# Patient Record
Sex: FEMALE | Race: WHITE | NOT HISPANIC OR LATINO | URBAN - METROPOLITAN AREA
[De-identification: names, ages, dates, MRNs, and addresses within clinical notes are randomized per-mention and may not be internally consistent; named-entity substitution may affect disease eponyms.]

---

## 2024-05-24 ENCOUNTER — HOSPITAL ENCOUNTER (OUTPATIENT)
Facility: HOSPITAL | Age: 44
Setting detail: OBSERVATION
Discharge: HOME | End: 2024-05-25
Attending: EMERGENCY MEDICINE | Admitting: HOSPITALIST
Payer: OTHER GOVERNMENT

## 2024-05-24 ENCOUNTER — APPOINTMENT (OUTPATIENT)
Dept: CT IMAGING | Facility: HOSPITAL | Age: 44
End: 2024-05-24
Payer: OTHER GOVERNMENT

## 2024-05-24 VITALS
HEIGHT: 63 IN | SYSTOLIC BLOOD PRESSURE: 143 MMHG | OXYGEN SATURATION: 98 % | DIASTOLIC BLOOD PRESSURE: 95 MMHG | HEART RATE: 73 BPM | RESPIRATION RATE: 18 BRPM | WEIGHT: 146.1 LBS | TEMPERATURE: 98.6 F | BODY MASS INDEX: 25.89 KG/M2

## 2024-05-24 DIAGNOSIS — T50.905A DRUG-INDUCED HEPATITIS: Primary | ICD-10-CM

## 2024-05-24 DIAGNOSIS — K71.6 DRUG-INDUCED HEPATITIS: Primary | ICD-10-CM

## 2024-05-24 DIAGNOSIS — R17 JAUNDICE: ICD-10-CM

## 2024-05-24 PROBLEM — S36.119A LIVER INJURY: Status: ACTIVE | Noted: 2024-05-24

## 2024-05-24 LAB
ALBUMIN SERPL-MCNC: 4.2 G/DL (ref 3.5–5.2)
ALBUMIN/GLOB SERPL: 1.4 G/DL
ALP SERPL-CCNC: 376 U/L (ref 39–117)
ALT SERPL W P-5'-P-CCNC: 330 U/L (ref 1–33)
ANION GAP SERPL CALCULATED.3IONS-SCNC: 9 MMOL/L (ref 5–15)
APAP SERPL-MCNC: 7.7 MCG/ML (ref 0–30)
AST SERPL-CCNC: 235 U/L (ref 1–32)
B-HCG UR QL: NEGATIVE
BASOPHILS # BLD AUTO: 0.02 10*3/MM3 (ref 0–0.2)
BASOPHILS NFR BLD AUTO: 0.3 % (ref 0–1.5)
BILIRUB SERPL-MCNC: 3.1 MG/DL (ref 0–1.2)
BILIRUB UR QL STRIP: ABNORMAL
BUN SERPL-MCNC: 11 MG/DL (ref 6–20)
BUN/CREAT SERPL: 10.9 (ref 7–25)
CALCIUM SPEC-SCNC: 9.5 MG/DL (ref 8.6–10.5)
CHLORIDE SERPL-SCNC: 99 MMOL/L (ref 98–107)
CLARITY UR: CLEAR
CO2 SERPL-SCNC: 30 MMOL/L (ref 22–29)
COLOR UR: ABNORMAL
CREAT SERPL-MCNC: 1.01 MG/DL (ref 0.57–1)
DEPRECATED RDW RBC AUTO: 36.1 FL (ref 37–54)
EGFRCR SERPLBLD CKD-EPI 2021: 69.7 ML/MIN/1.73
EOSINOPHIL # BLD AUTO: 0.25 10*3/MM3 (ref 0–0.4)
EOSINOPHIL NFR BLD AUTO: 3.3 % (ref 0.3–6.2)
ERYTHROCYTE [DISTWIDTH] IN BLOOD BY AUTOMATED COUNT: 11.8 % (ref 12.3–15.4)
GLOBULIN UR ELPH-MCNC: 2.9 GM/DL
GLUCOSE SERPL-MCNC: 108 MG/DL (ref 65–99)
GLUCOSE UR STRIP-MCNC: NEGATIVE MG/DL
HAV IGM SERPL QL IA: NORMAL
HBV CORE IGM SERPL QL IA: NORMAL
HBV SURFACE AG SERPL QL IA: NORMAL
HCT VFR BLD AUTO: 39.8 % (ref 34–46.6)
HCV AB SER QL: NORMAL
HGB BLD-MCNC: 13.2 G/DL (ref 12–15.9)
HGB UR QL STRIP.AUTO: NEGATIVE
HOLD SPECIMEN: NORMAL
IMM GRANULOCYTES # BLD AUTO: 0.04 10*3/MM3 (ref 0–0.05)
IMM GRANULOCYTES NFR BLD AUTO: 0.5 % (ref 0–0.5)
INR PPP: 0.84 (ref 0.89–1.12)
KETONES UR QL STRIP: NEGATIVE
LEUKOCYTE ESTERASE UR QL STRIP.AUTO: NEGATIVE
LIPASE SERPL-CCNC: 43 U/L (ref 13–60)
LYMPHOCYTES # BLD AUTO: 2.03 10*3/MM3 (ref 0.7–3.1)
LYMPHOCYTES NFR BLD AUTO: 26.4 % (ref 19.6–45.3)
MCH RBC QN AUTO: 28 PG (ref 26.6–33)
MCHC RBC AUTO-ENTMCNC: 33.2 G/DL (ref 31.5–35.7)
MCV RBC AUTO: 84.3 FL (ref 79–97)
MONOCYTES # BLD AUTO: 0.66 10*3/MM3 (ref 0.1–0.9)
MONOCYTES NFR BLD AUTO: 8.6 % (ref 5–12)
NEUTROPHILS NFR BLD AUTO: 4.68 10*3/MM3 (ref 1.7–7)
NEUTROPHILS NFR BLD AUTO: 60.9 % (ref 42.7–76)
NITRITE UR QL STRIP: NEGATIVE
NRBC BLD AUTO-RTO: 0 /100 WBC (ref 0–0.2)
PH UR STRIP.AUTO: 6.5 [PH] (ref 5–8)
PLATELET # BLD AUTO: 334 10*3/MM3 (ref 140–450)
PMV BLD AUTO: 8.7 FL (ref 6–12)
POTASSIUM SERPL-SCNC: 4.1 MMOL/L (ref 3.5–5.2)
PROT SERPL-MCNC: 7.1 G/DL (ref 6–8.5)
PROT UR QL STRIP: NEGATIVE
PROTHROMBIN TIME: 11.6 SECONDS (ref 12.2–14.5)
RBC # BLD AUTO: 4.72 10*6/MM3 (ref 3.77–5.28)
SALICYLATES SERPL-MCNC: <0.3 MG/DL
SODIUM SERPL-SCNC: 138 MMOL/L (ref 136–145)
SP GR UR STRIP: 1.02 (ref 1–1.03)
UROBILINOGEN UR QL STRIP: ABNORMAL
WBC NRBC COR # BLD AUTO: 7.68 10*3/MM3 (ref 3.4–10.8)
WHOLE BLOOD HOLD COAG: NORMAL
WHOLE BLOOD HOLD SPECIMEN: NORMAL

## 2024-05-24 PROCEDURE — 96375 TX/PRO/DX INJ NEW DRUG ADDON: CPT

## 2024-05-24 PROCEDURE — 81003 URINALYSIS AUTO W/O SCOPE: CPT | Performed by: EMERGENCY MEDICINE

## 2024-05-24 PROCEDURE — 0 DEXTROSE 5 % SOLUTION 250 ML FLEX CONT: Performed by: STUDENT IN AN ORGANIZED HEALTH CARE EDUCATION/TRAINING PROGRAM

## 2024-05-24 PROCEDURE — 74176 CT ABD & PELVIS W/O CONTRAST: CPT

## 2024-05-24 PROCEDURE — 96365 THER/PROPH/DIAG IV INF INIT: CPT

## 2024-05-24 PROCEDURE — 80143 DRUG ASSAY ACETAMINOPHEN: CPT | Performed by: STUDENT IN AN ORGANIZED HEALTH CARE EDUCATION/TRAINING PROGRAM

## 2024-05-24 PROCEDURE — 83690 ASSAY OF LIPASE: CPT | Performed by: EMERGENCY MEDICINE

## 2024-05-24 PROCEDURE — 80074 ACUTE HEPATITIS PANEL: CPT | Performed by: EMERGENCY MEDICINE

## 2024-05-24 PROCEDURE — 25010000002 ACETYLCYSTEINE PER 100 MG: Performed by: STUDENT IN AN ORGANIZED HEALTH CARE EDUCATION/TRAINING PROGRAM

## 2024-05-24 PROCEDURE — 99285 EMERGENCY DEPT VISIT HI MDM: CPT

## 2024-05-24 PROCEDURE — G0378 HOSPITAL OBSERVATION PER HR: HCPCS

## 2024-05-24 PROCEDURE — 85025 COMPLETE CBC W/AUTO DIFF WBC: CPT | Performed by: EMERGENCY MEDICINE

## 2024-05-24 PROCEDURE — 25010000002 DIPHENHYDRAMINE PER 50 MG: Performed by: STUDENT IN AN ORGANIZED HEALTH CARE EDUCATION/TRAINING PROGRAM

## 2024-05-24 PROCEDURE — 85610 PROTHROMBIN TIME: CPT | Performed by: STUDENT IN AN ORGANIZED HEALTH CARE EDUCATION/TRAINING PROGRAM

## 2024-05-24 PROCEDURE — 80053 COMPREHEN METABOLIC PANEL: CPT | Performed by: EMERGENCY MEDICINE

## 2024-05-24 PROCEDURE — 81025 URINE PREGNANCY TEST: CPT | Performed by: STUDENT IN AN ORGANIZED HEALTH CARE EDUCATION/TRAINING PROGRAM

## 2024-05-24 PROCEDURE — 80179 DRUG ASSAY SALICYLATE: CPT | Performed by: STUDENT IN AN ORGANIZED HEALTH CARE EDUCATION/TRAINING PROGRAM

## 2024-05-24 PROCEDURE — 99222 1ST HOSP IP/OBS MODERATE 55: CPT | Performed by: STUDENT IN AN ORGANIZED HEALTH CARE EDUCATION/TRAINING PROGRAM

## 2024-05-24 PROCEDURE — 25810000003 LACTATED RINGERS PER 1000 ML: Performed by: STUDENT IN AN ORGANIZED HEALTH CARE EDUCATION/TRAINING PROGRAM

## 2024-05-24 RX ORDER — SODIUM CHLORIDE 0.9 % (FLUSH) 0.9 %
10 SYRINGE (ML) INJECTION EVERY 12 HOURS SCHEDULED
Status: DISCONTINUED | OUTPATIENT
Start: 2024-05-25 | End: 2024-05-25 | Stop reason: HOSPADM

## 2024-05-24 RX ORDER — POLYETHYLENE GLYCOL 3350 17 G/17G
17 POWDER, FOR SOLUTION ORAL DAILY PRN
Status: DISCONTINUED | OUTPATIENT
Start: 2024-05-24 | End: 2024-05-25 | Stop reason: HOSPADM

## 2024-05-24 RX ORDER — SODIUM CHLORIDE, SODIUM LACTATE, POTASSIUM CHLORIDE, CALCIUM CHLORIDE 600; 310; 30; 20 MG/100ML; MG/100ML; MG/100ML; MG/100ML
100 INJECTION, SOLUTION INTRAVENOUS CONTINUOUS
Status: DISCONTINUED | OUTPATIENT
Start: 2024-05-25 | End: 2024-05-25 | Stop reason: HOSPADM

## 2024-05-24 RX ORDER — SODIUM CHLORIDE 0.9 % (FLUSH) 0.9 %
10 SYRINGE (ML) INJECTION AS NEEDED
Status: DISCONTINUED | OUTPATIENT
Start: 2024-05-24 | End: 2024-05-25 | Stop reason: HOSPADM

## 2024-05-24 RX ORDER — SODIUM CHLORIDE 9 MG/ML
40 INJECTION, SOLUTION INTRAVENOUS AS NEEDED
Status: DISCONTINUED | OUTPATIENT
Start: 2024-05-24 | End: 2024-05-25 | Stop reason: HOSPADM

## 2024-05-24 RX ORDER — AMOXICILLIN 250 MG
2 CAPSULE ORAL 2 TIMES DAILY PRN
Status: DISCONTINUED | OUTPATIENT
Start: 2024-05-24 | End: 2024-05-25 | Stop reason: HOSPADM

## 2024-05-24 RX ORDER — BISACODYL 5 MG/1
5 TABLET, DELAYED RELEASE ORAL DAILY PRN
Status: DISCONTINUED | OUTPATIENT
Start: 2024-05-24 | End: 2024-05-25 | Stop reason: HOSPADM

## 2024-05-24 RX ORDER — ONDANSETRON 2 MG/ML
4 INJECTION INTRAMUSCULAR; INTRAVENOUS EVERY 6 HOURS PRN
Status: DISCONTINUED | OUTPATIENT
Start: 2024-05-24 | End: 2024-05-25 | Stop reason: HOSPADM

## 2024-05-24 RX ORDER — NALOXONE HCL 0.4 MG/ML
0.4 VIAL (ML) INJECTION
Status: DISCONTINUED | OUTPATIENT
Start: 2024-05-24 | End: 2024-05-25 | Stop reason: HOSPADM

## 2024-05-24 RX ORDER — BISACODYL 10 MG
10 SUPPOSITORY, RECTAL RECTAL DAILY PRN
Status: DISCONTINUED | OUTPATIENT
Start: 2024-05-24 | End: 2024-05-25 | Stop reason: HOSPADM

## 2024-05-24 RX ORDER — MORPHINE SULFATE 2 MG/ML
1 INJECTION, SOLUTION INTRAMUSCULAR; INTRAVENOUS EVERY 4 HOURS PRN
Status: DISCONTINUED | OUTPATIENT
Start: 2024-05-24 | End: 2024-05-25 | Stop reason: HOSPADM

## 2024-05-24 RX ORDER — DIPHENHYDRAMINE HYDROCHLORIDE 50 MG/ML
25 INJECTION INTRAMUSCULAR; INTRAVENOUS EVERY 6 HOURS PRN
Status: DISCONTINUED | OUTPATIENT
Start: 2024-05-24 | End: 2024-05-25 | Stop reason: HOSPADM

## 2024-05-24 RX ADMIN — Medication 10 ML: at 23:57

## 2024-05-24 RX ADMIN — ACETYLCYSTEINE 9940 MG: 6 INJECTION, SOLUTION INTRAVENOUS at 23:54

## 2024-05-24 RX ADMIN — SODIUM CHLORIDE, POTASSIUM CHLORIDE, SODIUM LACTATE AND CALCIUM CHLORIDE 100 ML/HR: 600; 310; 30; 20 INJECTION, SOLUTION INTRAVENOUS at 23:22

## 2024-05-24 RX ADMIN — DIPHENHYDRAMINE HYDROCHLORIDE 25 MG: 50 INJECTION INTRAMUSCULAR; INTRAVENOUS at 23:22

## 2024-05-24 NOTE — ED PROVIDER NOTES
Subjective   History of Present Illness  44-year-old female who presents for evaluation of nausea vomiting headache, jaundice, and previous dysuria.  The patient states that roughly 2 weeks ago she began to have dysuria and urgency.  A week and a half ago she presented to her primary care physician and was diagnosed with urine tract infection and initiated on Macrobid.  She states that she began to have symptoms of nausea vomiting headache and significant generalized fatigue after she initiated Macrobid.  She only took it for 4 days before she discontinued it.  She ultimately was seen 2 days ago by her primary care physician reportedly did not have any jaundice at that Time.  However starting yesterday she began to notice orange appearance to her urine and now feels like her sclera is jaundiced.  She denies taking Pyridium or Azo.  She has been recommended by her primary care physician after she reached out by phone to present to the ER for further workup.  Upon review of her records by the primary care physician it was noted that she has bilirubin in her urine currently and greater than a year ago when she had symptoms concerning for urinary tract infection he also had bilirubin in her urine and bloodstream at that Time.  I am unable to see these labs at what level that was out.  She denies current abdominal pain but does state 1 year ago when she was diagnosed urinary tract infection she did have some right flank pain.  She feels like the previous dysuria has resolved.  No previous surgeries to the abdomen.  No history of cancer or unexpected change in weight.  She denies alcohol use or excessive Tylenol use.  No chest pain cough or shortness of breath.  No other acute complaints      Review of Systems   Constitutional:  Positive for activity change, appetite change and fatigue. Negative for chills and fever.   HENT:  Negative for congestion, ear pain, postnasal drip, sinus pressure and sore throat.    Eyes:   Negative for pain, redness and visual disturbance.   Respiratory:  Negative for cough, chest tightness and shortness of breath.    Cardiovascular:  Negative for chest pain, palpitations and leg swelling.   Gastrointestinal:  Positive for nausea and vomiting. Negative for abdominal pain, anal bleeding, blood in stool and diarrhea.   Endocrine: Negative for polydipsia and polyuria.   Genitourinary:  Negative for difficulty urinating, dysuria, frequency and urgency.   Musculoskeletal:  Negative for arthralgias, back pain and neck pain.   Skin:  Negative for pallor and rash.   Allergic/Immunologic: Negative for environmental allergies and immunocompromised state.   Neurological:  Positive for headaches. Negative for dizziness and weakness.   Hematological:  Negative for adenopathy.   Psychiatric/Behavioral:  Negative for confusion, self-injury and suicidal ideas. The patient is not nervous/anxious.    All other systems reviewed and are negative.      History reviewed. No pertinent past medical history.    Allergies   Allergen Reactions    Shellfish-Derived Products Anaphylaxis    Macrobid [Nitrofurantoin] Other (See Comments)     Acute Liver failure.       History reviewed. No pertinent surgical history.    History reviewed. No pertinent family history.    Social History     Socioeconomic History    Marital status:    Tobacco Use    Smoking status: Never   Vaping Use    Vaping status: Never Used   Substance and Sexual Activity    Alcohol use: Never    Drug use: Never    Sexual activity: Defer           Objective   Physical Exam  Vitals and nursing note reviewed.   Constitutional:       General: She is not in acute distress.     Appearance: Normal appearance. She is well-developed. She is not toxic-appearing or diaphoretic.   HENT:      Head: Normocephalic and atraumatic.      Right Ear: External ear normal.      Left Ear: External ear normal.      Nose: Nose normal.   Eyes:      General: Lids are normal. Scleral  icterus present.      Pupils: Pupils are equal, round, and reactive to light.   Neck:      Trachea: No tracheal deviation.   Cardiovascular:      Rate and Rhythm: Normal rate and regular rhythm.      Pulses: No decreased pulses.      Heart sounds: Normal heart sounds. No murmur heard.     No friction rub. No gallop.   Pulmonary:      Effort: Pulmonary effort is normal. No respiratory distress.      Breath sounds: Normal breath sounds. No decreased breath sounds, wheezing, rhonchi or rales.   Abdominal:      General: Bowel sounds are normal.      Palpations: Abdomen is soft.      Tenderness: There is no abdominal tenderness. There is no guarding or rebound.   Musculoskeletal:         General: No deformity. Normal range of motion.      Cervical back: Normal range of motion and neck supple.   Lymphadenopathy:      Cervical: No cervical adenopathy.   Skin:     General: Skin is warm and dry.      Findings: No rash.   Neurological:      Mental Status: She is alert and oriented to person, place, and time.      Cranial Nerves: No cranial nerve deficit.      Sensory: No sensory deficit.   Psychiatric:         Speech: Speech normal.         Behavior: Behavior normal.         Thought Content: Thought content normal.         Judgment: Judgment normal.         Procedures           ED Course  ED Course as of 05/27/24 1144   Fri May 24, 2024   2013 The patient began taking Macrobid roughly a week and a half ago.  Only took for 4 days because she had nausea vomiting and felt fatigued at that time.  Yesterday she began to notice scleral icterus and that her urine was orange.  She denies dysuria.  She has no abdominal pain.  Normal labs except for elevated LFTs, total bili, and alk phos.  No history of alcohol use or previous abdominal surgeries.  Hepatitis panel was negative.  CT scan does report the common bile duct is dilated and that she has gallstones but no signs of cholecystitis.  MRCP has been recommended by radiologist  I  discussed with Dr. Granados who felt this was most likely drug-induced hepatitis but is agreeable with inpatient evaluation and MRCP tomorrow. [NS]      ED Course User Index  [NS] Cristino Duarte MD                                             Medical Decision Making  Differential diagnosis includes drug-induced hepatitis, viral hepatitis, elevated otitis, cholecystitis, Choledocholilithaisis    Urine shows no signs of infection but does show moderate bilirubin.  Negative viral hepatitis panel.  Normal Tylenol and salicylate level.  Normal kidney function with no significant electrolyte abnormalities.  Normal white count, normal H&H.  Normal lipase.    CT scan of abd/pelvis withoutcontrast shows common bile duct at 8 mm.  No cholelithiasis.  Cholelithiasis without pericholecystic inflammation and no signs of acute cholecystitis.  Renal ureterolithiasis.    The patient was discussed with the on-call GI provider, Dr. Granados, who recommends admission, but feels this is most likely secondary to drug induced hepatitis.     The patient continues to be well appearing with no signs of pain or other complaints.     Problems Addressed:  Drug-induced hepatitis: complicated acute illness or injury with systemic symptoms  Jaundice: complicated acute illness or injury with systemic symptoms    Amount and/or Complexity of Data Reviewed  External Data Reviewed: labs and radiology.  Labs: ordered. Decision-making details documented in ED Course.  Radiology: ordered. Decision-making details documented in ED Course.  ECG/medicine tests: ordered and independent interpretation performed. Decision-making details documented in ED Course.     Details: EKG independently interpreted by myself shows sinus rhythm with no acute ischemic changes    Risk  Prescription drug management.  Decision regarding hospitalization.        Final diagnoses:   Drug-induced hepatitis   Jaundice       ED Disposition  ED Disposition       ED Disposition   Decision  to Admit    Condition   --    Comment   Level of Care: Telemetry [5]   Diagnosis: Liver injury [071144]   Admitting Physician: KEVAN BERG [[id number]]   Attending Physician: KEVAN BERG [[id number]]                 Provider, No Known  [place] [id number]      Next week with CMP to monitor liver function         Medication List      No changes were made to your prescriptions during this visit.            Cristino Duarte MD  05/27/24 1147

## 2024-05-24 NOTE — Clinical Note
Level of Care: Telemetry [5]   Diagnosis: Liver injury [032295]   Admitting Physician: KEVAN BERG [[id number]]   Attending Physician: KEVAN BERG [[id number]]

## 2024-05-25 ENCOUNTER — APPOINTMENT (OUTPATIENT)
Dept: MRI IMAGING | Facility: HOSPITAL | Age: 44
End: 2024-05-25
Payer: OTHER GOVERNMENT

## 2024-05-25 LAB
ALBUMIN SERPL-MCNC: 3.7 G/DL (ref 3.5–5.2)
ALBUMIN/GLOB SERPL: 1.3 G/DL
ALP SERPL-CCNC: 318 U/L (ref 39–117)
ALT SERPL W P-5'-P-CCNC: 296 U/L (ref 1–33)
ANION GAP SERPL CALCULATED.3IONS-SCNC: 8 MMOL/L (ref 5–15)
AST SERPL-CCNC: 199 U/L (ref 1–32)
BILIRUB SERPL-MCNC: 2.6 MG/DL (ref 0–1.2)
BUN SERPL-MCNC: 7 MG/DL (ref 6–20)
BUN/CREAT SERPL: 8.9 (ref 7–25)
CALCIUM SPEC-SCNC: 8.8 MG/DL (ref 8.6–10.5)
CHLORIDE SERPL-SCNC: 105 MMOL/L (ref 98–107)
CO2 SERPL-SCNC: 27 MMOL/L (ref 22–29)
CREAT SERPL-MCNC: 0.79 MG/DL (ref 0.57–1)
DEPRECATED RDW RBC AUTO: 35.9 FL (ref 37–54)
EGFRCR SERPLBLD CKD-EPI 2021: 93.6 ML/MIN/1.73
ERYTHROCYTE [DISTWIDTH] IN BLOOD BY AUTOMATED COUNT: 11.9 % (ref 12.3–15.4)
GLOBULIN UR ELPH-MCNC: 2.9 GM/DL
GLUCOSE SERPL-MCNC: 98 MG/DL (ref 65–99)
HCT VFR BLD AUTO: 39.2 % (ref 34–46.6)
HGB BLD-MCNC: 13.4 G/DL (ref 12–15.9)
MAGNESIUM SERPL-MCNC: 2.4 MG/DL (ref 1.6–2.6)
MCH RBC QN AUTO: 29 PG (ref 26.6–33)
MCHC RBC AUTO-ENTMCNC: 34.2 G/DL (ref 31.5–35.7)
MCV RBC AUTO: 84.8 FL (ref 79–97)
PLATELET # BLD AUTO: 317 10*3/MM3 (ref 140–450)
PMV BLD AUTO: 8.8 FL (ref 6–12)
POTASSIUM SERPL-SCNC: 4.1 MMOL/L (ref 3.5–5.2)
PROT SERPL-MCNC: 6.6 G/DL (ref 6–8.5)
RBC # BLD AUTO: 4.62 10*6/MM3 (ref 3.77–5.28)
SODIUM SERPL-SCNC: 140 MMOL/L (ref 136–145)
WBC NRBC COR # BLD AUTO: 6.62 10*3/MM3 (ref 3.4–10.8)

## 2024-05-25 PROCEDURE — 80053 COMPREHEN METABOLIC PANEL: CPT | Performed by: STUDENT IN AN ORGANIZED HEALTH CARE EDUCATION/TRAINING PROGRAM

## 2024-05-25 PROCEDURE — 85027 COMPLETE CBC AUTOMATED: CPT | Performed by: STUDENT IN AN ORGANIZED HEALTH CARE EDUCATION/TRAINING PROGRAM

## 2024-05-25 PROCEDURE — 99232 SBSQ HOSP IP/OBS MODERATE 35: CPT | Performed by: HOSPITALIST

## 2024-05-25 PROCEDURE — 25010000002 ACETYLCYSTEINE PER 100 MG: Performed by: STUDENT IN AN ORGANIZED HEALTH CARE EDUCATION/TRAINING PROGRAM

## 2024-05-25 PROCEDURE — 99204 OFFICE O/P NEW MOD 45 MIN: CPT | Performed by: NURSE PRACTITIONER

## 2024-05-25 PROCEDURE — 25010000002 ONDANSETRON PER 1 MG: Performed by: STUDENT IN AN ORGANIZED HEALTH CARE EDUCATION/TRAINING PROGRAM

## 2024-05-25 PROCEDURE — 96375 TX/PRO/DX INJ NEW DRUG ADDON: CPT

## 2024-05-25 PROCEDURE — 74181 MRI ABDOMEN W/O CONTRAST: CPT

## 2024-05-25 PROCEDURE — 83735 ASSAY OF MAGNESIUM: CPT | Performed by: STUDENT IN AN ORGANIZED HEALTH CARE EDUCATION/TRAINING PROGRAM

## 2024-05-25 PROCEDURE — G0378 HOSPITAL OBSERVATION PER HR: HCPCS

## 2024-05-25 PROCEDURE — 0 DEXTROSE 5 % SOLUTION 500 ML FLEX CONT: Performed by: STUDENT IN AN ORGANIZED HEALTH CARE EDUCATION/TRAINING PROGRAM

## 2024-05-25 PROCEDURE — 93005 ELECTROCARDIOGRAM TRACING: CPT | Performed by: STUDENT IN AN ORGANIZED HEALTH CARE EDUCATION/TRAINING PROGRAM

## 2024-05-25 PROCEDURE — 93010 ELECTROCARDIOGRAM REPORT: CPT | Performed by: INTERNAL MEDICINE

## 2024-05-25 PROCEDURE — 0 DEXTROSE 5 % SOLUTION 1,000 ML FLEX CONT: Performed by: STUDENT IN AN ORGANIZED HEALTH CARE EDUCATION/TRAINING PROGRAM

## 2024-05-25 RX ORDER — IBUPROFEN 400 MG/1
400 TABLET ORAL ONCE
Status: COMPLETED | OUTPATIENT
Start: 2024-05-25 | End: 2024-05-25

## 2024-05-25 RX ORDER — HYDROXYZINE HYDROCHLORIDE 25 MG/1
25 TABLET, FILM COATED ORAL ONCE
Status: COMPLETED | OUTPATIENT
Start: 2024-05-25 | End: 2024-05-25

## 2024-05-25 RX ORDER — CHOLESTYRAMINE LIGHT 4 G/5.7G
1 POWDER, FOR SUSPENSION ORAL EVERY 12 HOURS SCHEDULED
Status: DISCONTINUED | OUTPATIENT
Start: 2024-05-25 | End: 2024-05-25 | Stop reason: HOSPADM

## 2024-05-25 RX ADMIN — HYDROXYZINE HYDROCHLORIDE 25 MG: 25 TABLET, FILM COATED ORAL at 01:13

## 2024-05-25 RX ADMIN — IBUPROFEN 400 MG: 400 TABLET, FILM COATED ORAL at 06:26

## 2024-05-25 RX ADMIN — ACETYLCYSTEINE 6.25 MG/KG/HR: 6 INJECTION, SOLUTION INTRAVENOUS at 06:25

## 2024-05-25 RX ADMIN — ONDANSETRON 4 MG: 2 INJECTION INTRAMUSCULAR; INTRAVENOUS at 00:49

## 2024-05-25 RX ADMIN — ACETYLCYSTEINE 3320 MG: 6 INJECTION, SOLUTION INTRAVENOUS at 01:14

## 2024-05-25 RX ADMIN — CHOLESTYRAMINE 4 G: 4 POWDER, FOR SUSPENSION ORAL at 10:28

## 2024-05-25 NOTE — PLAN OF CARE
Goal Outcome Evaluation:     Problem: Adult Inpatient Plan of Care  Goal: Plan of Care Review  Outcome: Ongoing, Progressing  Goal: Patient-Specific Goal (Individualized)  Outcome: Ongoing, Progressing  Goal: Absence of Hospital-Acquired Illness or Injury  Outcome: Ongoing, Progressing  Intervention: Identify and Manage Fall Risk  Recent Flowsheet Documentation  Taken 5/25/2024 0000 by Ramez Santamaria RN  Safety Promotion/Fall Prevention:   nonskid shoes/slippers when out of bed   room organization consistent   safety round/check completed  Taken 5/24/2024 2300 by Ramez Santamaria RN  Safety Promotion/Fall Prevention:   nonskid shoes/slippers when out of bed   safety round/check completed   clutter free environment maintained  Intervention: Prevent Skin Injury  Recent Flowsheet Documentation  Taken 5/25/2024 0000 by Ramez Santamaria RN  Body Position: position changed independently  Taken 5/24/2024 2300 by Ramez Santamaria RN  Body Position: position changed independently  Intervention: Prevent and Manage VTE (Venous Thromboembolism) Risk  Recent Flowsheet Documentation  Taken 5/25/2024 0000 by Ramez Santamaria RN  Activity Management: activity encouraged  Taken 5/24/2024 2300 by Ramez Santamaria RN  Activity Management: ambulated in room  VTE Prevention/Management: patient refused intervention  Goal: Optimal Comfort and Wellbeing  Outcome: Ongoing, Progressing  Intervention: Provide Person-Centered Care  Recent Flowsheet Documentation  Taken 5/24/2024 2300 by Ramez Santamaria RN  Trust Relationship/Rapport:   care explained   questions answered  Goal: Readiness for Transition of Care  Outcome: Ongoing, Progressing  Intervention: Mutually Develop Transition Plan  Recent Flowsheet Documentation  Taken 5/25/2024 0038 by Ramez Santamaria RN  Transportation Anticipated: family or friend will provide  Patient/Family Anticipated Services at Transition: none  Patient/Family Anticipates Transition to:   home   home with  family  Taken 5/25/2024 0037 by Ramez Santamaria, RN  Equipment Currently Used at Home: none     Problem: Pain Acute  Goal: Acceptable Pain Control and Functional Ability  Outcome: Ongoing, Progressing     Problem: Fall Injury Risk  Goal: Absence of Fall and Fall-Related Injury  Outcome: Ongoing, Progressing  Intervention: Promote Injury-Free Environment  Recent Flowsheet Documentation  Taken 5/25/2024 0000 by Ramez Santamaria, RN  Safety Promotion/Fall Prevention:   nonskid shoes/slippers when out of bed   room organization consistent   safety round/check completed  Taken 5/24/2024 2300 by Ramez Santamaria, RN  Safety Promotion/Fall Prevention:   nonskid shoes/slippers when out of bed   safety round/check completed   clutter free environment maintained     Problem: Infection  Goal: Absence of Infection Signs and Symptoms  Outcome: Ongoing, Progressing

## 2024-05-25 NOTE — PLAN OF CARE
Goal Outcome Evaluation:     Problem: Adult Inpatient Plan of Care  Goal: Plan of Care Review  5/25/2024 0616 by Ramez Santamaria RN  Outcome: Ongoing, Progressing  5/25/2024 0053 by Ramez Santamaria RN  Outcome: Ongoing, Progressing  Goal: Patient-Specific Goal (Individualized)  5/25/2024 0616 by Ramez Santamaria RN  Outcome: Ongoing, Progressing  5/25/2024 0053 by Ramez Santamaria RN  Outcome: Ongoing, Progressing  Goal: Absence of Hospital-Acquired Illness or Injury  5/25/2024 0616 by Ramez Santamaria RN  Outcome: Ongoing, Progressing  5/25/2024 0053 by Ramez Santamaria RN  Outcome: Ongoing, Progressing  Intervention: Identify and Manage Fall Risk  Recent Flowsheet Documentation  Taken 5/25/2024 0600 by Ramez Santamaria RN  Safety Promotion/Fall Prevention:   activity supervised   assistive device/personal items within reach   clutter free environment maintained   nonskid shoes/slippers when out of bed   safety round/check completed  Taken 5/25/2024 0400 by Ramez Santamaria RN  Safety Promotion/Fall Prevention:   activity supervised   assistive device/personal items within reach   clutter free environment maintained   nonskid shoes/slippers when out of bed   safety round/check completed  Taken 5/25/2024 0200 by Ramez Santamaria RN  Safety Promotion/Fall Prevention:   activity supervised   assistive device/personal items within reach   clutter free environment maintained   nonskid shoes/slippers when out of bed   safety round/check completed  Taken 5/25/2024 0000 by Ramez Santamaria RN  Safety Promotion/Fall Prevention:   nonskid shoes/slippers when out of bed   room organization consistent   safety round/check completed  Taken 5/24/2024 2300 by Ramez Santamaria RN  Safety Promotion/Fall Prevention:   nonskid shoes/slippers when out of bed   safety round/check completed   clutter free environment maintained  Intervention: Prevent Skin Injury  Recent Flowsheet Documentation  Taken 5/25/2024 0600 by Ramez Santamaria  RN  Body Position: position changed independently  Taken 5/25/2024 0400 by Ramez Santamaria RN  Body Position: position changed independently  Taken 5/25/2024 0200 by Ramez Santamaria RN  Body Position: position changed independently  Taken 5/25/2024 0000 by Ramez Santamaria RN  Body Position: position changed independently  Taken 5/24/2024 2300 by Ramez Santamaria RN  Body Position: position changed independently  Intervention: Prevent and Manage VTE (Venous Thromboembolism) Risk  Recent Flowsheet Documentation  Taken 5/25/2024 0200 by Ramez Santamaria RN  Activity Management: ambulated in room  Taken 5/25/2024 0000 by Ramez Santamaria RN  Activity Management: activity encouraged  Taken 5/24/2024 2300 by Ramez Santamaria RN  Activity Management: ambulated in room  VTE Prevention/Management: patient refused intervention  Goal: Optimal Comfort and Wellbeing  5/25/2024 0616 by Ramez Santamaria RN  Outcome: Ongoing, Progressing  5/25/2024 0053 by Ramez Santamaria RN  Outcome: Ongoing, Progressing  Intervention: Provide Person-Centered Care  Recent Flowsheet Documentation  Taken 5/24/2024 2300 by Ramez Santamaria RN  Trust Relationship/Rapport:   care explained   questions answered  Goal: Readiness for Transition of Care  5/25/2024 0616 by Ramez Santamaria RN  Outcome: Ongoing, Progressing  5/25/2024 0053 by Ramez Santamaria RN  Outcome: Ongoing, Progressing  Intervention: Mutually Develop Transition Plan  Recent Flowsheet Documentation  Taken 5/25/2024 0038 by Ramez Santamaria RN  Transportation Anticipated: family or friend will provide  Patient/Family Anticipated Services at Transition: none  Patient/Family Anticipates Transition to:   home   home with family  Taken 5/25/2024 0037 by Ramez Santamaria RN  Equipment Currently Used at Home: none     Problem: Pain Acute  Goal: Acceptable Pain Control and Functional Ability  5/25/2024 0616 by Ramez Santamaria RN  Outcome: Ongoing, Progressing  5/25/2024 0053 by Rosalio  SAMREEN Myrick  Outcome: Ongoing, Progressing     Problem: Fall Injury Risk  Goal: Absence of Fall and Fall-Related Injury  5/25/2024 0616 by Ramez Santamaria RN  Outcome: Ongoing, Progressing  5/25/2024 0053 by Ramez Santamaria RN  Outcome: Ongoing, Progressing  Intervention: Promote Injury-Free Environment  Recent Flowsheet Documentation  Taken 5/25/2024 0600 by Ramez Santamaria RN  Safety Promotion/Fall Prevention:   activity supervised   assistive device/personal items within reach   clutter free environment maintained   nonskid shoes/slippers when out of bed   safety round/check completed  Taken 5/25/2024 0400 by Ramez Santamaria RN  Safety Promotion/Fall Prevention:   activity supervised   assistive device/personal items within reach   clutter free environment maintained   nonskid shoes/slippers when out of bed   safety round/check completed  Taken 5/25/2024 0200 by Ramez Santamaria RN  Safety Promotion/Fall Prevention:   activity supervised   assistive device/personal items within reach   clutter free environment maintained   nonskid shoes/slippers when out of bed   safety round/check completed  Taken 5/25/2024 0000 by Ramez Santamaria RN  Safety Promotion/Fall Prevention:   nonskid shoes/slippers when out of bed   room organization consistent   safety round/check completed  Taken 5/24/2024 2300 by Ramez Santamaria RN  Safety Promotion/Fall Prevention:   nonskid shoes/slippers when out of bed   safety round/check completed   clutter free environment maintained     Problem: Infection  Goal: Absence of Infection Signs and Symptoms  5/25/2024 0616 by Ramez Santamaria RN  Outcome: Ongoing, Progressing  5/25/2024 0053 by Ramez Santamaria RN  Outcome: Ongoing, Progressing

## 2024-05-25 NOTE — CONSULTS
[facility]  Inpatient Gastroenterology Consult    Inpatient Gastroenterology Consult  Consult performed by: Marcio Rowe APRN  Consult ordered by: Chris Kennedy MD  Reason for consult: Drug-induced liver injury secondary to Macrobid      Referring Provider: No ref. provider found    PCP: Provider, No Known    Chief Complaint: Elevated LFTs and itching    History of present illness:    Abena Hanson is a 44 y.o. female who is admitted with with a 2-day onset of itching and jaundice.  Patient notes she was in her usual state of health until earlier in the week when she developed UTI-like symptoms and is prescribed Macrobid.  Patient notes that over the past 48 hours she noticed significant jaundice and began itching; was seen by PCP who felt symptoms were related to Macrobid and discontinued it at that time.  Due to worsening symptoms, presents to ER for further evaluation.  Does not report any nausea, vomiting, diarrhea, change in bowel habits, shortness of breath, chest pain, or fever/chills.  Has had some mild right upper quadrant tenderness.  Reports having a prior episode similar to this in the past was told that she had gallstones at that time.  At time of admission, LFTs found to be elevated in cholestatic fashion.  MRI pending.  Does not report any personal history of liver disease.  Does report a family history of alcohol-related liver disease.  No heavy use of acetaminophen or salicylate containing compounds noted.  No heavy alcohol use.  Past medical, surgical, social, and family histories are reviewed for accuracy.  No documented alleviating or exacerbating factors.  Does not endorse pain at time of exam.    Allergies:  Shellfish-derived products and Macrobid [nitrofurantoin]    Scheduled Meds:  cholestyramine light, 1 packet, Oral, Q12H  sodium chloride, 10 mL, Intravenous, Q12H         Infusions:  acetylcysteine (ACETADOTE) 6000 mg in D5W infusion (NON-APAP LIVER  FAILURE) Third Dose, 6.25 mg/kg/hr, Last Rate: 6.25 mg/kg/hr (05/25/24 0685)  lactated ringers, 100 mL/hr, Last Rate: 100 mL/hr (05/24/24 8772)        PRN Meds:    senna-docusate sodium **AND** polyethylene glycol **AND** bisacodyl **AND** bisacodyl    Calcium Replacement - Follow Nurse / BPA Driven Protocol    diphenhydrAMINE    Magnesium Standard Dose Replacement - Follow Nurse / BPA Driven Protocol    Morphine **AND** naloxone    ondansetron    Phosphorus Replacement - Follow Nurse / BPA Driven Protocol    Potassium Replacement - Follow Nurse / BPA Driven Protocol    sodium chloride    sodium chloride    sodium chloride    Home Meds:  No medications prior to admission.       ROS: Review of Systems   Constitutional:  Negative for activity change, appetite change, chills, diaphoresis, fatigue, fever and unexpected weight change.   HENT:  Negative for sore throat, trouble swallowing and voice change.    Eyes: Negative.    Respiratory:  Negative for apnea, cough, choking, chest tightness, shortness of breath, wheezing and stridor.    Cardiovascular:  Negative for chest pain, palpitations and leg swelling.   Gastrointestinal:  Negative for abdominal distention, abdominal pain, anal bleeding, blood in stool, constipation, diarrhea, nausea, rectal pain and vomiting.   Endocrine: Negative.    Genitourinary: Negative.    Musculoskeletal: Negative.    Skin:  Positive for color change.        Itching   Allergic/Immunologic: Negative.    Neurological: Negative.    Hematological: Negative.    Psychiatric/Behavioral: Negative.     All other systems reviewed and are negative.      PAST MED HX:  History reviewed. No pertinent past medical history.    PAST SURG HX:  History reviewed. No pertinent surgical history.    FAM HX:  History reviewed. No pertinent family history.    SOC HX:  Social History     Socioeconomic History    Marital status:    Tobacco Use    Smoking status: Never   Vaping Use    Vaping status: Never Used  "  Substance and Sexual Activity    Alcohol use: Never    Drug use: Never    Sexual activity: Defer       PHYSICAL EXAM  /95 (BP Location: Left arm, Patient Position: Lying)   Pulse 73   Temp 98.6 °F (37 °C) (Oral)   Resp 18   Ht 160 cm (63\")   Wt 66.3 kg (146 lb 1.6 oz)   SpO2 98%   BMI 25.88 kg/m²   Wt Readings from Last 3 Encounters:   05/24/24 66.3 kg (146 lb 1.6 oz)   02/11/19 72.6 kg (160 lb)   ,body mass index is 25.88 kg/m².  Physical Exam  Vitals and nursing note reviewed.   Constitutional:       General: She is not in acute distress.     Appearance: Normal appearance. She is normal weight. She is not ill-appearing or toxic-appearing.   Eyes:      General: Scleral icterus present.   Cardiovascular:      Rate and Rhythm: Normal rate and regular rhythm.      Pulses: Normal pulses.      Heart sounds: Normal heart sounds.   Pulmonary:      Effort: Pulmonary effort is normal. No respiratory distress.      Breath sounds: Normal breath sounds.   Abdominal:      General: Abdomen is flat. Bowel sounds are normal. There is no distension.      Palpations: Abdomen is soft. There is no mass.      Tenderness: There is no abdominal tenderness. There is no guarding or rebound.      Hernia: No hernia is present.   Genitourinary:     Comments: defer  Musculoskeletal:         General: Normal range of motion.      Right lower leg: No edema.      Left lower leg: No edema.   Skin:     General: Skin is warm and dry.      Capillary Refill: Capillary refill takes less than 2 seconds.      Coloration: Skin is jaundiced. Skin is not pale.   Neurological:      General: No focal deficit present.      Mental Status: She is alert and oriented to person, place, and time.   Psychiatric:         Mood and Affect: Mood normal.         Behavior: Behavior normal.         Thought Content: Thought content normal.         Judgment: Judgment normal.         Results Review:   I reviewed the patient's new clinical results.  I reviewed the " patient's new imaging results and agree with the interpretation.  I reviewed the patient's other test results and agree with the interpretation  I personally viewed and interpreted the patient's EKG/Telemetry data    Lab Results   Component Value Date    WBC 6.62 05/25/2024    HGB 13.4 05/25/2024    HGB 13.2 05/24/2024    HCT 39.2 05/25/2024    MCV 84.8 05/25/2024     05/25/2024       Lab Results   Component Value Date    INR 0.84 (L) 05/24/2024       Lab Results   Component Value Date    GLUCOSE 98 05/25/2024    BUN 7 05/25/2024    CREATININE 0.79 05/25/2024    BCR 8.9 05/25/2024     05/25/2024    K 4.1 05/25/2024    CO2 27.0 05/25/2024    CALCIUM 8.8 05/25/2024    ALBUMIN 3.7 05/25/2024    ALKPHOS 318 (H) 05/25/2024    BILITOT 2.6 (H) 05/25/2024     (H) 05/25/2024     (H) 05/25/2024       MRI abdomen wo contrast mrcp    Result Date: 5/25/2024  MRI ABDOMEN WO CONTRAST MRCP Date of Exam: 5/25/2024 2:33 AM EDT Indication: concern for choledocho.  Comparison: 5/24/2024 CT abdomen pelvis Technique:  Routine multiplanar/multisequence images of the abdomen were obtained with MRCP sequences without contrast administration. Findings: Lung bases: Lung bases are clear. There is no pleural or pericardial effusion. Liver: Liver is normal in morphology and signal intensity. No focal liver lesions are identified. Biliary: Borderline enlarged common bile duct measuring up to 8 mm. No filling defect within the biliary system to suggest choledocholithiasis. There is smooth tapering of the distal common bile duct without evidence of mass lesion or obstruction. Gallbladder: Cholelithiasis without evidence of cholecystitis. Pancreas: Pancreatic duct is normal in caliber, without evidence of beading or stricturing. No focal pancreatic lesion identified. Adrenal glands: Normal. Kidneys: Normal. Spleen: Normal. Bowel: Visualized portions appear within normal limits. Vasculature: Visualized aorta is normal in  caliber. IVC is patent. Lymph nodes: No enlarged lymph nodes in the abdomen. Peritoneal space: No free fluid in the abdomen. Musculoskeletal: Bone marrow signal is within normal limits.     Impression: Borderline dilated common bile duct with no evidence of filling defects to suggest choledocholithiasis. There is smooth tapering of the distal common bile duct. Cholelithiasis without evidence of cholecystitis. Electronically Signed: Brian Ch MD  5/25/2024 9:18 AM EDT  Workstation ID: [id number]    CT Abdomen Pelvis Without Contrast    Result Date: 5/24/2024  CT ABDOMEN PELVIS WO CONTRAST Date of Exam: 5/24/2024 6:20 PM EDT Indication: dysuria/jaundice. Comparison: None available. Technique: Axial CT images were obtained of the abdomen and pelvis without the administration of contrast. Reconstructed coronal and sagittal images were also obtained. Automated exposure control and iterative construction methods were used. Findings: Lung bases are without consolidation. Negative for pericardial effusion or pleural effusion. Lack of contrast limits assessment of abdominal organs and vasculature. The liver is normal in size and contour. There is cholelithiasis. No pericholecystic inflammation. Common bile duct is mildly dilated measuring 8 mm. The spleen is normal in size. The adrenal glands are normal. The pancreas is without findings of pancreatitis. Kidneys symmetric in size. No renal or ureteral calculus. Urinary bladder is under distended and without acute abnormality. Negative for perinephric stranding. IUD in the uterus. Normal ovaries for age. No free fluid in the pelvis. Negative for pneumoperitoneum. No bowel obstruction. Moderate amount of stool throughout the colon proximally. The appendix is normal. Abdominal aorta without aneurysm. Disc narrowing and posterior disc osteophyte centrally at L5-S1. No aggressive osseous lesion or acute fracture.     Impression: 1. Mildly dilated common bile duct measuring 8  mm, given jaundice consider MRCP to evaluate for choledocholithiasis. 2. Cholelithiasis without pericholecystic inflammation or findings of acute cholecystitis. 3. Negative for renal or ureteral calculus. Electronically Signed: Eliceo Watkins MD  5/24/2024 7:18 PM EDT  Workstation ID: [id number]     ASSESSMENTS/PLANS  1.  Drug-induced liver injury, Macrobid  2.  Elevated LFTs, cholestatic fashion, related to above  3.  Cholelithiasis without choledocholithiasis    Abena Hanson is a 44 y.o. female who presents to hospital with a 2-day onset of worsening itching and jaundice.  At time of admission, LFTs found to be elevated in cholestatic fashion.  MRI has now resulted with a mildly dilated CBD without evidence of choledocholithiasis.  Likely etiology of symptoms is drug-induced liver injury secondary to Macrobid. Mentation normal. INR normal. Acute Hep normal.     Going forward, patient instructed to avoid any further exposure to Macrobid.  This has now been listed as an allergy.  Typically, drug-induced liver injury secondary to Macrobid resolves fairly quickly though there are case reports of a more prolonged recovery i.e. 2 to 6 months. Will need outpatient labs with PCP to monitor for complete resolution of injury.     >>> Anticipate resolution of injury over the next few weeks.  >>> Instructed to abstain from any further exposure Macrobid; it is now listed as an allergy.  >>> Increase protein intake during recovery phase  >>> May use Tylenol for mild to moderate pain as needed up to 2,000 mg daily  >>> Avoid hepatotoxins    Okay to discontinue NAC protocol.  If patient is tolerating diet and feeling well, okay for discharge home from GI standpoint with outpatient follow-up with her PCP in 1 week for labs.  If LFTs remain elevated at the 1 week ciro, will need follow-up with gastroenterology/hepatology at that time.    Please call with questions.    I discussed the patient's findings and my recommendations with  patient, family, nursing staff, and consulting provider    Marcio Rowe, ARTURO  05/25/24  12:07 EDT

## 2024-05-25 NOTE — ED NOTES
" Abena Hanson    Nursing Report ED to Floor:  Mental status: GCS 15  Ambulatory status: INDEPENDENT   Oxygen Therapy:  RA  Cardiac Rhythm: SINUS   Admitted from: HOME  Safety Concerns:  N/A  Social Issues: N/A  ED Room #:  07    ED Nurse Phone Extension - [phone] or may call [phone].      HPI:   Chief Complaint   Patient presents with    Allergic Reaction       Past Medical History:  History reviewed. No pertinent past medical history.     Past Surgical History:  History reviewed. No pertinent surgical history.     Admitting Doctor:   Chris Kennedy MD    Consulting Provider(s):  Consults       No orders found from 4/25/2024 to 5/25/2024.             Admitting Diagnosis:   The primary encounter diagnosis was Drug-induced hepatitis. A diagnosis of Jaundice was also pertinent to this visit.    Most Recent Vitals:   Vitals:    05/24/24 1657 05/24/24 1755 05/24/24 1932   BP: 132/84 128/89 128/89   BP Location: Left arm Right arm    Patient Position: Sitting Sitting    Pulse: 74 71 73   Resp: 18 18 18   Temp: 98 °F (36.7 °C) 98.7 °F (37.1 °C)    TempSrc: Oral Oral    SpO2: 99% 97% 97%   Weight: 72.6 kg (160 lb)     Height: 160 cm (63\")         Active LDAs/IV Access:   Lines, Drains & Airways       Active LDAs       Name Placement date Placement time Site Days    Peripheral IV 05/24/24 1727 Left Antecubital 05/24/24  1727  Antecubital  less than 1                    Labs (abnormal labs have a star):   Labs Reviewed   COMPREHENSIVE METABOLIC PANEL - Abnormal; Notable for the following components:       Result Value    Glucose 108 (*)     Creatinine 1.01 (*)     CO2 30.0 (*)     ALT (SGPT) 330 (*)     AST (SGOT) 235 (*)     Alkaline Phosphatase 376 (*)     Total Bilirubin 3.1 (*)     All other components within normal limits    Narrative:     GFR Normal >60  Chronic Kidney Disease <60  Kidney Failure <15     URINALYSIS W/ CULTURE IF INDICATED - Abnormal; Notable for the following components:    Color, UA Dark Yellow (*)     " Bilirubin, UA Moderate (2+) (*)     All other components within normal limits    Narrative:     In absence of clinical symptoms, the presence of pyuria, bacteria, and/or nitrites on the urinalysis result does not correlate with infection.  Urine microscopic not indicated.   CBC WITH AUTO DIFFERENTIAL - Abnormal; Notable for the following components:    RDW 11.8 (*)     RDW-SD 36.1 (*)     All other components within normal limits   HEPATITIS PANEL, ACUTE - Normal    Narrative:     Results may be falsely decreased if patient taking Biotin.    LIPASE - Normal   RAINBOW DRAW    Narrative:     The following orders were created for panel order [place] Draw.  Procedure                               Abnormality         Status                     ---------                               -----------         ------                     Green Top (Gel)[[id number]]                                  Final result               Lavender Top[[id number]]                                     Final result               Gold Top - SST[[id number]]                                   Final result               Gray Top[[id number]]                                         Final result               Light Blue Top[[id number]]                                   Final result                 Please view results for these tests on the individual orders.   GREEN TOP   LAVENDER TOP   GOLD TOP - SST   GRAY TOP   LIGHT BLUE TOP   CBC AND DIFFERENTIAL    Narrative:     The following orders were created for panel order CBC & Differential.  Procedure                               Abnormality         Status                     ---------                               -----------         ------                     CBC Auto Differential[[id number]]        Abnormal            Final result                 Please view results for these tests on the individual orders.       Meds Given in ED:   Medications   sodium chloride 0.9 % flush 10 mL (has no administration in time  range)           Last NIH score:                                                          Dysphagia screening results:        [place] Coma Scale:  No data recorded     CIWA:        Restraint Type:            Isolation Status:  No active isolations

## 2024-05-25 NOTE — H&P
[facility] Medicine Services  HISTORY AND PHYSICAL    Patient Name: Abena Hanson  : 1980  MRN: [id number]  Primary Care Physician: Provider, No Known  Date of admission: 2024      Subjective   Subjective     Chief Complaint:  Change in skin color    HPI:  Abena Hanson is a 44 y.o. female without significant medical history who presents with itching and change in skin color.  Noticed it about 2 days ago.  She was recently prescribed Macrobid for UTI, took for about 5 days and then stopped 2 days ago when she noticed change in skin color.  Initially saw PCP who stated this might be reaction to Macrobid and held medication.  Symptoms began to worsen so she presented to the ED.  She has no fevers chills, shortness of breath, chest pain, abdominal pain.  Feels a little nauseous but no vomiting.  No dizziness or confusion.      Personal History     History reviewed. No pertinent past medical history.        History reviewed. No pertinent surgical history.    Family History: family history is not on file.     Social History:  reports that she has never smoked. She does not have any smokeless tobacco history on file.  Social History     Social History Narrative    Not on file       Medications:  Available home medication information reviewed.       Allergies   Allergen Reactions    Shellfish-Derived Products Anaphylaxis       Objective   Objective     Vital Signs:   Temp:  [98 °F (36.7 °C)-98.7 °F (37.1 °C)] 98.6 °F (37 °C)  Heart Rate:  [71-74] 73  Resp:  [18] 18  BP: (128-143)/(84-95) 143/95       Physical Exam   Awake alert and oriented x 3  Resting comfortably in bed  Scleral and generalized icterus present  Mucous membranes moist  Heart regular rate and rhythm  Lungs are clear to auscultation bilaterally  Abdomen is soft, nondistended, and nontender in all 4 quadrants  There is no lower extremity edema  No rashes on exposed skin surfaces    Result Review:  I have personally  reviewed the results from the time of this admission to 5/24/2024 23:08 EDT and agree with these findings:  [x]  Laboratory list / accordion  []  Microbiology  [x]  Radiology  [x]  EKG/Telemetry   []  Cardiology/Vascular   []  Pathology  []  Old records  []  Other:  Most notable findings include: See assessment and plan      LAB RESULTS:      Lab 05/24/24  1725   WBC 7.68   HEMOGLOBIN 13.2   HEMATOCRIT 39.8   PLATELETS 334   NEUTROS ABS 4.68   IMMATURE GRANS (ABS) 0.04   LYMPHS ABS 2.03   MONOS ABS 0.66   EOS ABS 0.25   MCV 84.3         Lab 05/24/24  1725   SODIUM 138   POTASSIUM 4.1   CHLORIDE 99   CO2 30.0*   ANION GAP 9.0   BUN 11   CREATININE 1.01*   EGFR 69.7   GLUCOSE 108*   CALCIUM 9.5         Lab 05/24/24  1725   TOTAL PROTEIN 7.1   ALBUMIN 4.2   GLOBULIN 2.9   ALT (SGPT) 330*   AST (SGOT) 235*   BILIRUBIN 3.1*   ALK PHOS 376*   LIPASE 43                     UA          5/24/2024    18:06   Urinalysis   Specific [place], UA 1.019    Ketones, UA Negative    Blood, UA Negative    Leukocytes, UA Negative    Nitrite, UA Negative        Microbiology Results (last 10 days)       ** No results found for the last 240 hours. **            CT Abdomen Pelvis Without Contrast    Result Date: 5/24/2024  CT ABDOMEN PELVIS WO CONTRAST Date of Exam: 5/24/2024 6:20 PM EDT Indication: dysuria/jaundice. Comparison: None available. Technique: Axial CT images were obtained of the abdomen and pelvis without the administration of contrast. Reconstructed coronal and sagittal images were also obtained. Automated exposure control and iterative construction methods were used. Findings: Lung bases are without consolidation. Negative for pericardial effusion or pleural effusion. Lack of contrast limits assessment of abdominal organs and vasculature. The liver is normal in size and contour. There is cholelithiasis. No pericholecystic inflammation. Common bile duct is mildly dilated measuring 8 mm. The spleen is normal in size. The  adrenal glands are normal. The pancreas is without findings of pancreatitis. Kidneys symmetric in size. No renal or ureteral calculus. Urinary bladder is under distended and without acute abnormality. Negative for perinephric stranding. IUD in the uterus. Normal ovaries for age. No free fluid in the pelvis. Negative for pneumoperitoneum. No bowel obstruction. Moderate amount of stool throughout the colon proximally. The appendix is normal. Abdominal aorta without aneurysm. Disc narrowing and posterior disc osteophyte centrally at L5-S1. No aggressive osseous lesion or acute fracture.     Impression: Impression: 1. Mildly dilated common bile duct measuring 8 mm, given jaundice consider MRCP to evaluate for choledocholithiasis. 2. Cholelithiasis without pericholecystic inflammation or findings of acute cholecystitis. 3. Negative for renal or ureteral calculus. Electronically Signed: Eliceo Watkins MD  5/24/2024 7:18 PM EDT  Workstation ID: [id number]         Assessment & Plan   Assessment & Plan       Liver injury    Abnormal LFTs, suspecting drug-induced liver injury, also possible but less likely is choledocholithiasis.  -Recent exposure to Macrobid.  Presented afebrile, normotensive, normal heart rate.  Mentation is normal.  White blood cell count is normal.  Tylenol and salicylate negative.  , , alk phos 376, total bili 3.1.  Hepatitis panel negative.  INR is pending.  -CT scan shows mildly elevated common bile duct 8 mm and cholelithiasis without signs of cholecystitis.  -She has no tenderness on exam at all or abdominal symptoms and given timing of everything suspect DI LI as main cause.  Although given jaundice, to be sure will obtain MRCP.  -Will start on N-acetylcysteine protocol overnight for drug induced liver injury and recheck hepatic function panel in the morning    DVT prophylaxis:  Mechanical DVT prophylaxis orders are present.          CODE STATUS:    Code Status and Medical Interventions:    Ordered at: 05/24/24 2307     Code Status (Patient has no pulse and is not breathing):    CPR (Attempt to Resuscitate)     Medical Interventions (Patient has pulse or is breathing):    Full Support       Expected Discharge   Expected discharge date/ time has not been documented.     Chris Kennedy MD  05/24/24

## 2024-05-25 NOTE — PROGRESS NOTES
[facility] Medicine Services  PROGRESS NOTE    Patient Name: Abena Hanson  : 1980  MRN: [id number]    Date of Admission: 2024  Primary Care Physician: Provider, No Known    Subjective   Subjective     CC: Jaundice    HPI: Rested poorly. Nausea. Decreased appetite. Itchy. No f/c. No dyspnea.       Objective   Objective     Vital Signs:   Temp:  [98 °F (36.7 °C)-98.7 °F (37.1 °C)] 98.6 °F (37 °C)  Heart Rate:  [71-74] 73  Resp:  [18] 18  BP: (128-143)/(84-95) 143/95     Physical Exam:  NAD, alert and oriented  OP clear, dry MM  Neck supple  No LAD  RRR  CTAB  +BS, soft  SARABIA  Normal affect  Jaundiced    Results Reviewed:  LAB RESULTS:      Lab 24  1725   WBC 7.68   HEMOGLOBIN 13.2   HEMATOCRIT 39.8   PLATELETS 334   NEUTROS ABS 4.68   IMMATURE GRANS (ABS) 0.04   LYMPHS ABS 2.03   MONOS ABS 0.66   EOS ABS 0.25   MCV 84.3   PROTIME 11.6*         Lab 24  1725   SODIUM 138   POTASSIUM 4.1   CHLORIDE 99   CO2 30.0*   ANION GAP 9.0   BUN 11   CREATININE 1.01*   EGFR 69.7   GLUCOSE 108*   CALCIUM 9.5         Lab 24  1725   TOTAL PROTEIN 7.1   ALBUMIN 4.2   GLOBULIN 2.9   ALT (SGPT) 330*   AST (SGOT) 235*   BILIRUBIN 3.1*   ALK PHOS 376*   LIPASE 43         Lab 24  1725   PROTIME 11.6*   INR 0.84*                 Brief Urine Lab Results  (Last result in the past 365 days)        Color   Clarity   Blood   Leuk Est   Nitrite   Protein   CREAT   Urine HCG        24 1806               Negative       24 1806 Dark Yellow   Clear   Negative   Negative   Negative   Negative                   Microbiology Results Abnormal       None            CT Abdomen Pelvis Without Contrast    Result Date: 2024  CT ABDOMEN PELVIS WO CONTRAST Date of Exam: 2024 6:20 PM EDT Indication: dysuria/jaundice. Comparison: None available. Technique: Axial CT images were obtained of the abdomen and pelvis without the administration of contrast. Reconstructed coronal and  sagittal images were also obtained. Automated exposure control and iterative construction methods were used. Findings: Lung bases are without consolidation. Negative for pericardial effusion or pleural effusion. Lack of contrast limits assessment of abdominal organs and vasculature. The liver is normal in size and contour. There is cholelithiasis. No pericholecystic inflammation. Common bile duct is mildly dilated measuring 8 mm. The spleen is normal in size. The adrenal glands are normal. The pancreas is without findings of pancreatitis. Kidneys symmetric in size. No renal or ureteral calculus. Urinary bladder is under distended and without acute abnormality. Negative for perinephric stranding. IUD in the uterus. Normal ovaries for age. No free fluid in the pelvis. Negative for pneumoperitoneum. No bowel obstruction. Moderate amount of stool throughout the colon proximally. The appendix is normal. Abdominal aorta without aneurysm. Disc narrowing and posterior disc osteophyte centrally at L5-S1. No aggressive osseous lesion or acute fracture.     Impression: Impression: 1. Mildly dilated common bile duct measuring 8 mm, given jaundice consider MRCP to evaluate for choledocholithiasis. 2. Cholelithiasis without pericholecystic inflammation or findings of acute cholecystitis. 3. Negative for renal or ureteral calculus. Electronically Signed: Eliceo Watkins MD  5/24/2024 7:18 PM EDT  Workstation ID: [id number]         Current medications:  Scheduled Meds:sodium chloride, 10 mL, Intravenous, Q12H      Continuous Infusions:acetylcysteine (ACETADOTE) 6000 mg in D5W infusion (NON-APAP LIVER FAILURE) Third Dose, 6.25 mg/kg/hr, Last Rate: 6.25 mg/kg/hr (05/25/24 0625)  lactated ringers, 100 mL/hr, Last Rate: 100 mL/hr (05/24/24 9402)      PRN Meds:.  senna-docusate sodium **AND** polyethylene glycol **AND** bisacodyl **AND** bisacodyl    Calcium Replacement - Follow Nurse / BPA Driven Protocol    diphenhydrAMINE    Magnesium  Standard Dose Replacement - Follow Nurse / BPA Driven Protocol    Morphine **AND** naloxone    ondansetron    Phosphorus Replacement - Follow Nurse / BPA Driven Protocol    Potassium Replacement - Follow Nurse / BPA Driven Protocol    sodium chloride    sodium chloride    sodium chloride    Assessment & Plan   Assessment & Plan     Active Hospital Problems    Diagnosis  POA    **Liver injury [S36.119A]  Yes      Resolved Hospital Problems   No resolved problems to display.        Brief Hospital Course to date:  Abena Hanson is a 44 y.o. female with recent UTI, receiving Macrobid, here with abnormal LFTs    Suspected DILI  -MRCP pending  -GI consulted  -repeat labs pending  -on acetylcysteine        Expected Discharge Location and Transportation: Home  Expected Discharge   Expected Discharge Date: 5/26/2024; Expected Discharge Time:      DVT prophylaxis:  Mechanical DVT prophylaxis orders are present.         AM-PAC 6 Clicks Score (PT): 24 (05/24/24 7824)    CODE STATUS:   Code Status and Medical Interventions:   Ordered at: 05/24/24 1521     Code Status (Patient has no pulse and is not breathing):    CPR (Attempt to Resuscitate)     Medical Interventions (Patient has pulse or is breathing):    Full Support       Karl Torres MD  05/25/24

## 2024-05-26 LAB
QT INTERVAL: 386 MS
QTC INTERVAL: 425 MS

## 2024-05-26 NOTE — DISCHARGE SUMMARY
[facility] Medicine Services  DISCHARGE SUMMARY    Patient Name: Abena Hanson  : 1980  MRN: [id number]    Date of Admission: 2024  5:41 PM  Date of Discharge:  2024  Primary Care Physician: Provider, No Known    Consults       Date and Time Order Name Status Description    2024 11:08 PM Inpatient Gastroenterology Consult Completed             Hospital Course     Presenting Problem: DILI    Active Hospital Problems    Diagnosis  POA    **Liver injury [S36.119A]  Yes      Resolved Hospital Problems   No resolved problems to display.          Hospital Course:  Abena Hanson is a 44 y.o. female here for jaundice and abnormal LFTs. She was recently placed on Macrobid for UTI. She was evaluated GI and felt that it was likely DILI secondary to Macrobid. Her LFTs improved. She received acetylcysteine. GI felt that she was safe for discharge with close PCP follow up and repeat CMP next week and should avoid Macrobid indefinitely. MRCP revealed no definitive evidence of obstruction.       Discharge Follow Up Recommendations for outpatient labs/diagnostics:  PCP next week with CMP    Day of Discharge     HPI: Rested poorly. Nausea. Decreased appetite. Itchy. No f/c. No dyspnea.           Objective  Objective      Vital Signs:   Temp:  [98 °F (36.7 °C)-98.7 °F (37.1 °C)] 98.6 °F (37 °C)  Heart Rate:  [71-74] 73  Resp:  [18] 18  BP: (128-143)/(84-95) 143/95     Physical Exam:  NAD, alert and oriented  OP clear, dry MM  Neck supple  No LAD  RRR  CTAB  +BS, soft  SARABIA  Normal affect  Jaundiced    Pertinent  and/or Most Recent Results     LAB RESULTS:      Lab 24  0801 24  1725   WBC 6.62 7.68   HEMOGLOBIN 13.4 13.2   HEMATOCRIT 39.2 39.8   PLATELETS 317 334   NEUTROS ABS  --  4.68   IMMATURE GRANS (ABS)  --  0.04   LYMPHS ABS  --  2.03   MONOS ABS  --  0.66   EOS ABS  --  0.25   MCV 84.8 84.3   PROTIME  --  11.6*         Lab 24  0801 24  1725   SODIUM 140  138   POTASSIUM 4.1 4.1   CHLORIDE 105 99   CO2 27.0 30.0*   ANION GAP 8.0 9.0   BUN 7 11   CREATININE 0.79 1.01*   EGFR 93.6 69.7   GLUCOSE 98 108*   CALCIUM 8.8 9.5   MAGNESIUM 2.4  --          Lab 05/25/24  0801 05/24/24  1725   TOTAL PROTEIN 6.6 7.1   ALBUMIN 3.7 4.2   GLOBULIN 2.9 2.9   ALT (SGPT) 296* 330*   AST (SGOT) 199* 235*   BILIRUBIN 2.6* 3.1*   ALK PHOS 318* 376*   LIPASE  --  43         Lab 05/24/24  1725   PROTIME 11.6*   INR 0.84*                 Brief Urine Lab Results  (Last result in the past 365 days)        Color   Clarity   Blood   Leuk Est   Nitrite   Protein   CREAT   Urine HCG        05/24/24 1806               Negative       05/24/24 1806 Dark Yellow   Clear   Negative   Negative   Negative   Negative                 Microbiology Results (last 10 days)       ** No results found for the last 240 hours. **            MRI abdomen wo contrast mrcp    Result Date: 5/25/2024  MRI ABDOMEN WO CONTRAST MRCP Date of Exam: 5/25/2024 2:33 AM EDT Indication: concern for choledocho.  Comparison: 5/24/2024 CT abdomen pelvis Technique:  Routine multiplanar/multisequence images of the abdomen were obtained with MRCP sequences without contrast administration. Findings: Lung bases: Lung bases are clear. There is no pleural or pericardial effusion. Liver: Liver is normal in morphology and signal intensity. No focal liver lesions are identified. Biliary: Borderline enlarged common bile duct measuring up to 8 mm. No filling defect within the biliary system to suggest choledocholithiasis. There is smooth tapering of the distal common bile duct without evidence of mass lesion or obstruction. Gallbladder: Cholelithiasis without evidence of cholecystitis. Pancreas: Pancreatic duct is normal in caliber, without evidence of beading or stricturing. No focal pancreatic lesion identified. Adrenal glands: Normal. Kidneys: Normal. Spleen: Normal. Bowel: Visualized portions appear within normal limits. Vasculature:  Visualized aorta is normal in caliber. IVC is patent. Lymph nodes: No enlarged lymph nodes in the abdomen. Peritoneal space: No free fluid in the abdomen. Musculoskeletal: Bone marrow signal is within normal limits.     Impression: Borderline dilated common bile duct with no evidence of filling defects to suggest choledocholithiasis. There is smooth tapering of the distal common bile duct. Cholelithiasis without evidence of cholecystitis. Electronically Signed: Brian Ch MD  5/25/2024 9:18 AM EDT  Workstation ID: [id number]    CT Abdomen Pelvis Without Contrast    Result Date: 5/24/2024  CT ABDOMEN PELVIS WO CONTRAST Date of Exam: 5/24/2024 6:20 PM EDT Indication: dysuria/jaundice. Comparison: None available. Technique: Axial CT images were obtained of the abdomen and pelvis without the administration of contrast. Reconstructed coronal and sagittal images were also obtained. Automated exposure control and iterative construction methods were used. Findings: Lung bases are without consolidation. Negative for pericardial effusion or pleural effusion. Lack of contrast limits assessment of abdominal organs and vasculature. The liver is normal in size and contour. There is cholelithiasis. No pericholecystic inflammation. Common bile duct is mildly dilated measuring 8 mm. The spleen is normal in size. The adrenal glands are normal. The pancreas is without findings of pancreatitis. Kidneys symmetric in size. No renal or ureteral calculus. Urinary bladder is under distended and without acute abnormality. Negative for perinephric stranding. IUD in the uterus. Normal ovaries for age. No free fluid in the pelvis. Negative for pneumoperitoneum. No bowel obstruction. Moderate amount of stool throughout the colon proximally. The appendix is normal. Abdominal aorta without aneurysm. Disc narrowing and posterior disc osteophyte centrally at L5-S1. No aggressive osseous lesion or acute fracture.     Impression: 1. Mildly  dilated common bile duct measuring 8 mm, given jaundice consider MRCP to evaluate for choledocholithiasis. 2. Cholelithiasis without pericholecystic inflammation or findings of acute cholecystitis. 3. Negative for renal or ureteral calculus. Electronically Signed: Eliceo Watkins MD  5/24/2024 7:18 PM EDT  Workstation ID: [id number]                 Plan for Follow-up of Pending Labs/Results: Reviewed    Discharge Details        Discharge Medications      Patient Not Prescribed Medications Upon Discharge         Allergies   Allergen Reactions    Shellfish-Derived Products Anaphylaxis    Macrobid [Nitrofurantoin] Other (See Comments)     Acute Liver failure.         Discharge Disposition:  Home or Self Care    Diet:  Hospital:No active diet order           Activity:      Restrictions or Other Recommendations:         CODE STATUS:    Code Status and Medical Interventions:   Ordered at: 05/24/24 7524     Code Status (Patient has no pulse and is not breathing):    CPR (Attempt to Resuscitate)     Medical Interventions (Patient has pulse or is breathing):    Full Support       No future appointments.    Additional Instructions for the Follow-ups that You Need to Schedule       Discharge Follow-up with PCP   As directed       Currently Documented PCP:    Provider, No Known    PCP Phone Number:    None     Follow Up Details: Next week with CMP to monitor liver function                      Karl Torres MD  05/26/24      Time Spent on Discharge:  I spent  32  minutes on this discharge activity which included: face-to-face encounter with the patient, reviewing the data in the system, coordination of the care with the nursing staff as well as consultants, documentation, and entering orders.

## 2024-06-20 ENCOUNTER — TRANSCRIBE ORDERS (OUTPATIENT)
Dept: ADMINISTRATIVE | Facility: HOSPITAL | Age: 44
End: 2024-06-20
Payer: OTHER GOVERNMENT

## 2024-06-20 DIAGNOSIS — Z12.31 ENCOUNTER FOR SCREENING MAMMOGRAM FOR MALIGNANT NEOPLASM OF BREAST: Primary | ICD-10-CM

## 2024-07-10 LAB
NCCN CRITERIA FLAG: NORMAL
TYRER CUZICK SCORE: 8.9

## 2024-07-25 ENCOUNTER — HOSPITAL ENCOUNTER (OUTPATIENT)
Dept: MAMMOGRAPHY | Facility: HOSPITAL | Age: 44
Discharge: HOME | End: 2024-07-25
Admitting: FAMILY MEDICINE
Payer: OTHER GOVERNMENT

## 2024-07-25 DIAGNOSIS — Z12.31 ENCOUNTER FOR SCREENING MAMMOGRAM FOR MALIGNANT NEOPLASM OF BREAST: ICD-10-CM

## 2024-07-25 PROCEDURE — 77063 BREAST TOMOSYNTHESIS BI: CPT

## 2024-07-25 PROCEDURE — 77067 SCR MAMMO BI INCL CAD: CPT
